# Patient Record
Sex: MALE | Race: WHITE | NOT HISPANIC OR LATINO | URBAN - METROPOLITAN AREA
[De-identification: names, ages, dates, MRNs, and addresses within clinical notes are randomized per-mention and may not be internally consistent; named-entity substitution may affect disease eponyms.]

---

## 2018-01-25 NOTE — PATIENT DISCUSSION
(H00.11) Chalazion right upper eyelid - Assesment : Examination revealed Chalazion-RUL MAY TAKE SEVERAL MONTHS TO GO AWAY. DX DISCUSSED WITH PATIENT AND PATIENT'S MOM - Plan : WARM COMPRESSES BID  CONSIDER I & D IF WORSENS SIGNIFICANTLY  CONSIDER KENALOG INJECTION IF NO IMPROVEMENT  CALL IF SYMPTOMS WORSEN OR DOESN'T RESOLVE AND BECOMES BOTHERSOME.  F/U PRN

## 2018-09-06 NOTE — PATIENT DISCUSSION
(H00.11) Chalazion right upper eyelid - Assesment : Examination revealed Chalazion-RUL LATERAL - RESIDUAL (NEARLY RESOLVED)  DX DISCUSSED WITH PATIENT AND PATIENT'S MOM  HAD FLARE UP OVER SUMMER, DRAINED ON IT'S OWN RECOMMEND CONTINUING WARM SOAKS TO HELP DECREASE CHANCES OF FLARE UP OR NEW LOCATIONS. NO BACTERIAL INVOLVEMENT ON EXAMINATION TODAY.  - Plan : WARM COMPRESSES BID  CONSIDER AZITRHROMYCIN TOPICAL FOR PREVENTATIVE MEASURES. -PT TO CALL IF WANTS RX FOR GTT SENT IN F/U PRN

## 2022-01-24 ENCOUNTER — ESTABLISHED PATIENT (OUTPATIENT)
Dept: URBAN - METROPOLITAN AREA CLINIC 9 | Facility: CLINIC | Age: 72
End: 2022-01-24

## 2022-01-24 DIAGNOSIS — H04.222: ICD-10-CM

## 2022-01-24 DIAGNOSIS — H25.13: ICD-10-CM

## 2022-01-24 DIAGNOSIS — H31.001: ICD-10-CM

## 2022-01-24 DIAGNOSIS — H04.123: ICD-10-CM

## 2022-01-24 DIAGNOSIS — H35.372: ICD-10-CM

## 2022-01-24 PROCEDURE — 92015 DETERMINE REFRACTIVE STATE: CPT

## 2022-01-24 PROCEDURE — 92134 CPTRZ OPH DX IMG PST SGM RTA: CPT

## 2022-01-24 PROCEDURE — 68801 DILATE TEAR DUCT OPENING: CPT

## 2022-01-24 PROCEDURE — 92014 COMPRE OPH EXAM EST PT 1/>: CPT

## 2022-01-24 ASSESSMENT — VISUAL ACUITY
OS_SC: 20/20
OD_SC: 20/40-2
OD_GLARE: 20/20
OS_GLARE: 20/25

## 2022-01-24 ASSESSMENT — TONOMETRY
OS_IOP_MMHG: 14
OD_IOP_MMHG: 14

## 2024-02-12 ENCOUNTER — ESTABLISHED PATIENT (OUTPATIENT)
Facility: LOCATION | Age: 74
End: 2024-02-12

## 2024-02-12 DIAGNOSIS — H10.45: ICD-10-CM

## 2024-02-12 DIAGNOSIS — H01.022: ICD-10-CM

## 2024-02-12 DIAGNOSIS — H01.025: ICD-10-CM

## 2024-02-12 DIAGNOSIS — H31.001: ICD-10-CM

## 2024-02-12 DIAGNOSIS — H25.13: ICD-10-CM

## 2024-02-12 DIAGNOSIS — H04.123: ICD-10-CM

## 2024-02-12 DIAGNOSIS — H35.371: ICD-10-CM

## 2024-02-12 DIAGNOSIS — H49.11: ICD-10-CM

## 2024-02-12 PROCEDURE — 92015 DETERMINE REFRACTIVE STATE: CPT

## 2024-02-12 PROCEDURE — 92014 COMPRE OPH EXAM EST PT 1/>: CPT

## 2024-02-12 ASSESSMENT — TONOMETRY
OD_IOP_MMHG: 12
OS_IOP_MMHG: 14

## 2024-02-12 ASSESSMENT — VISUAL ACUITY
OS_CC: 20/20
OD_CC: 20/20-2
OS_SC: 20/20
OU_CC: 20/20
OU_SC: 20/20
OD_SC: 20/60-2
OU_CC: J1+
OD_GLARE: 20/30
OS_GLARE: 20/25

## 2025-02-17 ENCOUNTER — COMPREHENSIVE EXAM (OUTPATIENT)
Age: 75
End: 2025-02-17

## 2025-02-17 DIAGNOSIS — H25.13: ICD-10-CM

## 2025-02-17 DIAGNOSIS — H10.45: ICD-10-CM

## 2025-02-17 DIAGNOSIS — H01.025: ICD-10-CM

## 2025-02-17 DIAGNOSIS — H04.123: ICD-10-CM

## 2025-02-17 DIAGNOSIS — H35.371: ICD-10-CM

## 2025-02-17 DIAGNOSIS — H49.11: ICD-10-CM

## 2025-02-17 DIAGNOSIS — H31.001: ICD-10-CM

## 2025-02-17 DIAGNOSIS — H01.022: ICD-10-CM

## 2025-02-17 PROCEDURE — 92015 DETERMINE REFRACTIVE STATE: CPT

## 2025-02-17 PROCEDURE — 92134 CPTRZ OPH DX IMG PST SGM RTA: CPT

## 2025-02-17 PROCEDURE — 92014 COMPRE OPH EXAM EST PT 1/>: CPT
